# Patient Record
(demographics unavailable — no encounter records)

---

## 2025-04-11 NOTE — REASON FOR VISIT
[Initial Consultation] : an initial consultation
Yes

## 2025-04-15 NOTE — ASSESSMENT
[FreeTextEntry1] : Bernabe Moncada is a 70-year-old female who presents for an initial consultation for newly diagnosed pancreatic adenocarcinoma.   She was presented today at [facility] and was found to have locally advanced, LAPC-2 disease. Of note there is a small lung lesion through to be more inflammatory/infectious in nature, and a bone abnormality with negative bone scan. She is clearly distressed by her new diagnosis and somewhat overwhelmed with the implications. Expressed empathy and noted that the chronic pain she is experiencing could be debilitating to anyone in her position. We advise pain control, starting low dose oxycodone to help manager the pain. Offered her the opportunity to bring her back soon for re-evaluate and chemotherapy discussion at a later date but wishes to aggressively pursue chemotherapy at this point. Expressed some hesitation with chemotherapy eligibility given that she presents today in a wheelchair and given her fatigue but she and her sister endorse this is mainly due to her having not been eating much recently and deconditioning again due to unmet pain needs. I hope that with better pain control and symptom relief she can be a good candidate for gemcitabine/nab-paclitaxel and potentially for trial options such as TigerPac down the line.  Plan: - oxycodone 5 mg PRN for pain control - palliative care for pain control and symptom support; appetite - social work referral for support and transportation - psych onc for emotional support - consented for gemcitabine/nab-paclitaxel but would need to re-evaluate prior to infusion to ensure a good candidate - port - molecular and germline testing sent today  Tulio Villegas MD PhD Medical Oncology Attending I personally have spent a total of 60 minutes of time on the date of this encounter reviewing test results, documenting findings, coordinating care, and directly consulting with the patient and/or designated family member.

## 2025-04-15 NOTE — ASSESSMENT
[FreeTextEntry1] : Bernabe Moncada is a 70-year-old female who presents for an initial consultation for newly diagnosed pancreatic adenocarcinoma.   She was presented today at [facility] and was found to have locally advanced, LAPC-2 disease. Of note there is a small lung lesion through to be more inflammatory/infectious in nature, and a bone abnormality with negative bone scan. She is clearly distressed by her new diagnosis and somewhat overwhelmed with the implications. Expressed empathy and noted that the chronic pain she is experiencing could be debilitating to anyone in her position. We advise pain control, starting low dose oxycodone to help manager the pain. Offered her the opportunity to bring her back soon for re-evaluate and chemotherapy discussion at a later date but wishes to aggressively pursue chemotherapy at this point. Expressed some hesitation with chemotherapy eligibility given that she presents today in a wheelchair and given her fatigue but she and her sister endorse this is mainly due to her having not been eating much recently and deconditioning again due to unmet pain needs. I hope that with better pain control and symptom relief she can be a good candidate for gemcitabine/nab-paclitaxel and potentially for trial options such as TigerPac down the line.  Plan: - oxycodone 5 mg PRN for pain control - palliative care for pain control and symptom support; appetite - social work referral for support and transportation - psych onc for emotional support - consented for gemcitabine/nab-paclitaxel but would need to re-evaluate prior to infusion to ensure a good candidate - port - molecular and germline testing sent today  Tulio Villegas MD PhD Medical Oncology Attending I personally have spent a total of 60 minutes of time on the date of this encounter reviewing test results, documenting findings, coordinating care, and directly consulting with the patient and/or designated family member.

## 2025-04-15 NOTE — ASSESSMENT
[FreeTextEntry1] : Bernabe Moncada is a 70-year-old female who presents for an initial consultation for newly diagnosed pancreatic adenocarcinoma.   She was presented today at [facility] and was found to have locally advanced, LAPC-2 disease. Of note there is a small lung lesion through to be more inflammatory/infectious in nature, and a bone abnormality with negative bone scan. She is clearly distressed by her new diagnosis and somewhat overwhelmed with the implications. Expressed empathy and noted that the chronic pain she is experiencing could be debilitating to anyone in her position. We advise pain control, starting low dose oxycodone to help manager the pain. Offered her the opportunity to bring her back soon for re-evaluate and chemotherapy discussion at a later date but wishes to aggressively pursue chemotherapy at this point. Expressed some hesitation with chemotherapy eligibility given that she presents today in a wheelchair and given her fatigue but she and her sister endorse this is mainly due to her having not been eating much recently and deconditioning again due to unmet pain needs. I hope that with better pain control and symptom relief she can be a good candidate for gemcitabine/nab-paclitaxel and potentially for trial options such as TigerPac down the line.  Plan: - oxycodone 5 mg PRN for pain control - palliative care for pain control and symptom support; appetite - social work referral for support and transportation - psych onc for emotional support - consented for gemcitabine/nab-paclitaxel but would need to re-evaluate prior to infusion to ensure a good candidate - port - molecular and germline testing sent today  Tulio Villegas MD PhD Medical Oncology Attending I personally have spent a total of 60 minutes of time on the date of this encounter reviewing test results, documenting findings, coordinating care, and directly consulting with the patient and/or designated family member.

## 2025-04-15 NOTE — ASSESSMENT
[FreeTextEntry1] : 70F with 1.8 x.1.2 cm pancreas uncinate process mass with invasion of the celiac artery as well as proximal SMA, abutting splenic vein. Initially noted on workup for weakness and confusion. FNB biopsy proven adenocarcinoma.  3/30/25 - Ca 19-9: < 2, CEA: 1.9, Tbili: 0.6, A1c: 7.2%  Case reviewed in multidisciplinary conference and discussed with radiology, advanced GI, medical oncology, radiation oncology. We discussed that best outcomes for patients with pancreatic cancer are achieved with combination of chemotherapy and surgery, but the sequencing is frequently debated. We discussed Whipple procedure with curative intent, but that I favor a neoadjuvant approach. I reviewed the potential benefits of upfront systemic treatment with the patient including the potentially decreasing the size of the tumor, early treatment of metastatic disease, and test of tumor biology.  We discussed our recommendation for induction chemotherapy followed by restaging scans in 2 months.  Plan: Labs today Mediport placement 2 months of upfront chemo w/ med/onc Dr. Villegas with restaging scans in 2 months  Genetics testing Social work and dietician referral.

## 2025-04-15 NOTE — PHYSICAL EXAM
[FreeTextEntry1] : General: No acute distress. Well nourished and well kempt. Head: AT/NC Eyes: PERRL. EOMI. Pulmonary: No respiratory distress. Trach intact. ABD: Soft. NT/ND. No rebound, no guarding, no rigidity. No peritoneal signs. No masses. Extremities: Warm. No edema. Neurological: A&O x 4. Psychiatric: Normal affect and mood.

## 2025-04-15 NOTE — END OF VISIT
[FreeTextEntry3] : By signing my name below, I, Meildaelza Edwards, attest that this documentation has been prepared under the direction and in the presence of Bianca Smyth MD in the following sections HISTORY OF PRESENT ILLNESS; PAST MEDICAL/FAMILY/SOCIAL HISTORY; REVIEW OF SYSTEMS; PHYSICAL EXAM; ASSESSMENT/PLAN.

## 2025-04-15 NOTE — HISTORY OF PRESENT ILLNESS
[de-identified] : Ms. XOCHILT FLORES is a 70-year-old female [facility] patient presents for initial consultation regarding biopsy proven PDAC.  She initially presented to [facility] on 3/29/25 with weakness and confusion, 60 lbs weight loss over 2 months. CT A/P 3/29/2025 demonstrated new small soft tissue mass versus lymphadenopathy in the preaortic retroperitoneum which abuts the celiac axis and the proximal superior mesenteric artery. Indeterminate heterogeneous lesion in the L1 vertebral body.  CT chest 2025 showed a hypoenhancing infiltrative, diffusion restricted soft tissue mass again visualized encasing celiac axis and proximal branches as well as proximal SMA, abutting splenic vein, and involving uncinate process of the pancreas. The mass also appears to extend along the diaphragmatic pleura anterior to the aorta and posterior to the IVC best visualized on the diffusion restricted sequences. Partial opacification portosplenic confluence although well visualized on recent CT. Appearance suspicious for primary pancreatic carcinoma.  MRI 3/30/2025 demonstrated a hypoenhancing infiltrative, diffusion restricted soft tissue mass encasing celiac axis and proximal branches as well as proximal SMA, abutting splenic vein, and involving uncinate process of the pancreas. The mass also appears to extend along the diaphragmatic pleura anterior to the aorta and posterior to the IVC best visualized on the diffusion restricted sequences.  NM bone scan 3/31/25: No radionuclide evidence of osseous metastases.  Underwent EUS 2025 with Dr. Lane Melo which demonstrated a 18 x 12 mm mass in the uncinate with invasion of the celiac artery. FNB pathology revealed adenocarcinoma.  Has poor appetite / not eating well. Has nausea without vomiting. + Abdominal pain, taking tylenol with some relief. No steatorrhea.  Patient presents for initial consultation; also met with med/onc Dr. Villegas today.  Labs: 3/30/25 - Ca 19-9: < 2, CEA: 1.9, Tbili: 0.6, A1c: 7.2%.  PMHx: T2DM, HLD, HTN PSHx: none FMHx: no family cancer hx Social: H/oETOH abuse (drank 1 gallon of alcohol per week for about 10 years but has not drank recently) ECO

## 2025-04-15 NOTE — HISTORY OF PRESENT ILLNESS
[de-identified] : ID: Bernabe Moncada is a 70-year-old female who presents for an initial consultation.   Chronological Hx of Cancer: 03/29/25: Brought to [facility] by her sister for weakness, confusion, generalized pain, abdominal pain worse with eating, and 60lb wt loss over the past 2 months.  CT A/P: New small soft tissue mass versus lymphadenopathy in the preaortic retroperitoneum which abuts the celiac axis and the proximal superior mesenteric artery. Indeterminate heterogeneous lesion in the L1 vertebral body for which a metastatic focus is not excluded.  CT Head/Neck/Brain: Negative. 03/30/25 MR Abd: Locally infiltrative preaortic mass likely primary pancreatic carcinoma. 03/31/25 MR Lumbar Spine: Abnormal signal L1 vertebral body likely may represents a focal hemangioma 04/01/25 CT chest: Nonspecific very small nodule in the lingula, new since 2015. Recommend follow-up in 3 months, or per guidelines of primary neoplasm. 04/02/25 EUS of pancreatic head showed adenocarcinoma 04/06/25 EUS: - Erythematous gastritis, Biopsied - chronic inactive gastritis. 18 x 12 mm pancreatic uncinate mass with invasion of celiac artery, FNB - adenocarcinoma.     MMR status (all GI cancers): Tumor Mutation Data (if advanced): requested 4/15 Germline Data (if pancreas or young): sent 4/15 Signatera/ctDNA testing: HER2 (if eso/gastric): PD-L1 (if eso/gastric):   PMHx:T2DM, HLD, and HTN. H/o ETOH abuse ( drank 1 gallon of alcohol per week for about 10 years but has not drank recently) PSHx: Allergies: Social Hx: lives in [place] by herself, has a cousin Ladan who is a nurse, CNA; came from [facility] 25 years - h/o smoking/ ETOH use: - place of residency: - job: - hobby: - family: Goals of Care considered: 4/15 Pancreatic enyzmes considered (if pancreas): 4/15 Palliative care referral considered: 4/15 [de-identified] : 4/15/25 - minimal walking - stayed in bed all day - had a fall at home a couple of months ago - mostly has pain in the abdomen 10/10, tried tylenol but no help - weight is 144 pounds, lost 10 pounds in last 6 months - the pain impacts her ability to eat well - hasn't walked around the block since December

## 2025-04-15 NOTE — HISTORY OF PRESENT ILLNESS
[de-identified] : ID: Bernabe Moncada is a 70-year-old female who presents for an initial consultation.   Chronological Hx of Cancer: 03/29/25: Brought to [facility] by her sister for weakness, confusion, generalized pain, abdominal pain worse with eating, and 60lb wt loss over the past 2 months.  CT A/P: New small soft tissue mass versus lymphadenopathy in the preaortic retroperitoneum which abuts the celiac axis and the proximal superior mesenteric artery. Indeterminate heterogeneous lesion in the L1 vertebral body for which a metastatic focus is not excluded.  CT Head/Neck/Brain: Negative. 03/30/25 MR Abd: Locally infiltrative preaortic mass likely primary pancreatic carcinoma. 03/31/25 MR Lumbar Spine: Abnormal signal L1 vertebral body likely may represents a focal hemangioma 04/01/25 CT chest: Nonspecific very small nodule in the lingula, new since 2015. Recommend follow-up in 3 months, or per guidelines of primary neoplasm. 04/02/25 EUS of pancreatic head showed adenocarcinoma 04/06/25 EUS: - Erythematous gastritis, Biopsied - chronic inactive gastritis. 18 x 12 mm pancreatic uncinate mass with invasion of celiac artery, FNB - adenocarcinoma.     MMR status (all GI cancers): Tumor Mutation Data (if advanced): requested 4/15 Germline Data (if pancreas or young): sent 4/15 Signatera/ctDNA testing: HER2 (if eso/gastric): PD-L1 (if eso/gastric):   PMHx:T2DM, HLD, and HTN. H/o ETOH abuse ( drank 1 gallon of alcohol per week for about 10 years but has not drank recently) PSHx: Allergies: Social Hx: lives in [place] by herself, has a cousin Ladan who is a nurse, CNA; came from [facility] 25 years - h/o smoking/ ETOH use: - place of residency: - job: - hobby: - family: Goals of Care considered: 4/15 Pancreatic enyzmes considered (if pancreas): 4/15 Palliative care referral considered: 4/15 [de-identified] : 4/15/25 - minimal walking - stayed in bed all day - had a fall at home a couple of months ago - mostly has pain in the abdomen 10/10, tried tylenol but no help - weight is 144 pounds, lost 10 pounds in last 6 months - the pain impacts her ability to eat well - hasn't walked around the block since December

## 2025-04-15 NOTE — HISTORY OF PRESENT ILLNESS
[de-identified] : ID: Bernabe Moncada is a 70-year-old female who presents for an initial consultation.   Chronological Hx of Cancer: 03/29/25: Brought to [facility] by her sister for weakness, confusion, generalized pain, abdominal pain worse with eating, and 60lb wt loss over the past 2 months.  CT A/P: New small soft tissue mass versus lymphadenopathy in the preaortic retroperitoneum which abuts the celiac axis and the proximal superior mesenteric artery. Indeterminate heterogeneous lesion in the L1 vertebral body for which a metastatic focus is not excluded.  CT Head/Neck/Brain: Negative. 03/30/25 MR Abd: Locally infiltrative preaortic mass likely primary pancreatic carcinoma. 03/31/25 MR Lumbar Spine: Abnormal signal L1 vertebral body likely may represents a focal hemangioma 04/01/25 CT chest: Nonspecific very small nodule in the lingula, new since 2015. Recommend follow-up in 3 months, or per guidelines of primary neoplasm. 04/02/25 EUS of pancreatic head showed adenocarcinoma 04/06/25 EUS: - Erythematous gastritis, Biopsied - chronic inactive gastritis. 18 x 12 mm pancreatic uncinate mass with invasion of celiac artery, FNB - adenocarcinoma.     MMR status (all GI cancers): Tumor Mutation Data (if advanced): requested 4/15 Germline Data (if pancreas or young): sent 4/15 Signatera/ctDNA testing: HER2 (if eso/gastric): PD-L1 (if eso/gastric):   PMHx:T2DM, HLD, and HTN. H/o ETOH abuse ( drank 1 gallon of alcohol per week for about 10 years but has not drank recently) PSHx: Allergies: Social Hx: lives in [place] by herself, has a cousin Ladan who is a nurse, CNA; came from [facility] 25 years - h/o smoking/ ETOH use: - place of residency: - job: - hobby: - family: Goals of Care considered: 4/15 Pancreatic enyzmes considered (if pancreas): 4/15 Palliative care referral considered: 4/15 [de-identified] : 4/15/25 - minimal walking - stayed in bed all day - had a fall at home a couple of months ago - mostly has pain in the abdomen 10/10, tried tylenol but no help - weight is 144 pounds, lost 10 pounds in last 6 months - the pain impacts her ability to eat well - hasn't walked around the block since December

## 2025-04-16 NOTE — HISTORY OF PRESENT ILLNESS
[Abdominal Pain] : abdominal pain [Weight loss] : weight loss [EUS] : EUS [Biopsy] : biopsy performed [Not seen outside] : not seen outside [Nutrition] : Nutrition [Social Work] : Social Work [Palliative Care] : Palliative Care [Ambulatory and capable of all self care but unable to carry out any work activities] : Status 2 - Ambulatory and capable of all self care but unable to carry out any work activities. Up and about more than 50% of waking hours [de-identified] : This is a non-billable note*   Ms. IRVING FLORES is a 70 year old female who presents for evaluation in our Pancreas Multidisciplinary Clinic. Her PMH includes T2DM, HLD,HTN. No PSH.    Recently presented to [facility] on 3/29/25 with weakness and confusion, 60 lbs weight loss over 2 months.    CT A/P 3/29/25 showing new small soft tissue mass versus lymphadenopathy in the preaortic retroperitoneum which abuts the celiac axis and the proximal superior mesenteric artery.  Indeterminate heterogeneous lesion in the L1 vertebral body.   CT chest 25:  Hypoenhancing infiltrative, diffusion restricted soft tissue mass again visualized encasing celiac axis and proximal branches as well as proximal SMA, abutting splenic vein, and involving uncinate process of the pancreas. The mass also appears to extend along the diaphragmatic pleura anterior to the aorta and posterior to the IVC best visualized on the diffusion restricted sequences. Partial opacification portosplenic confluence although well visualized on recent CT.  Appearance suspicious for primary pancreatic carcinoma.    MRI 3/30/25: Hypoenhancing infiltrative, diffusion restricted soft tissue mass encasing celiac axis and proximal branches as well as proximal SMA, abutting splenic vein, and involving uncinate process of the pancreas. The mass also appears to extend along the diaphragmatic pleura anterior to the aorta and posterior to the IVC best visualized on the diffusion restricted sequences.    NM bone scan 3/31/25: No radionuclide evidence of osseous metastases.   EUS 25:  18 x 12 mm mass in the uncinate with invasion of the celiac artery.  s/p FNB.  Pathology: adenocarcinoma   Has poor appetite / not eating well. Has nausea without vomiting.   + Abdominal pain, taking tylenol with some relief.  No steatorrhea.   ECO Family ca hx: none   Labs:  3/30/25  Ca 19-9: < 2  CEA: 1.9   Tbili:  0.6  A1c: 7.2%   [TextBox_4] : 03/29/2025 [TextBox_39] : [id number] [TextBox_41] : Adenocarcinoma  [TextBox_43] : 04/02/2025 [Pancreatic ductal adenocarcinoma] : Pancreatic ductal adenocarcinoma [Locally advanced pancreas cancer (LAPC 2)] : Locally advanced pancreas cancer (LAPC 2) [Celiac axis] : Celiac axis [SMA] : SMA [More than 180 (encasement)] : more than 180 (encasement) [uncinate] : uncinate [Chemotherapy] : chemotherapy [TextBox_5] : 04/14/2025 [TextBox_38] : < 2 [FreeTextEntry3] : 1.9 [FreeTextEntry4] : 0.6 [TextBox_40] : 03/29/2025 [TextBox_56] : 23 [TextBox_74] : RP mass contiguous with the uncinate process. Encasement of the celiac axis/ SMA. Narrowing of the portal confluence.    CT chest:  5mm nodular opacity (inflammation or impacted airway)   [FreeTextEntry6] : Neoadjuvant chemotherapy, re-review in 2 months  Supportive care

## 2025-04-16 NOTE — HISTORY OF PRESENT ILLNESS
[Abdominal Pain] : abdominal pain [Weight loss] : weight loss [EUS] : EUS [Biopsy] : biopsy performed [Not seen outside] : not seen outside [Nutrition] : Nutrition [Social Work] : Social Work [Palliative Care] : Palliative Care [Ambulatory and capable of all self care but unable to carry out any work activities] : Status 2 - Ambulatory and capable of all self care but unable to carry out any work activities. Up and about more than 50% of waking hours [de-identified] : This is a non-billable note*   Ms. IRVING FLORES is a 70 year old female who presents for evaluation in our Pancreas Multidisciplinary Clinic. Her PMH includes T2DM, HLD,HTN. No PSH.    Recently presented to [facility] on 3/29/25 with weakness and confusion, 60 lbs weight loss over 2 months.    CT A/P 3/29/25 showing new small soft tissue mass versus lymphadenopathy in the preaortic retroperitoneum which abuts the celiac axis and the proximal superior mesenteric artery.  Indeterminate heterogeneous lesion in the L1 vertebral body.   CT chest 25:  Hypoenhancing infiltrative, diffusion restricted soft tissue mass again visualized encasing celiac axis and proximal branches as well as proximal SMA, abutting splenic vein, and involving uncinate process of the pancreas. The mass also appears to extend along the diaphragmatic pleura anterior to the aorta and posterior to the IVC best visualized on the diffusion restricted sequences. Partial opacification portosplenic confluence although well visualized on recent CT.  Appearance suspicious for primary pancreatic carcinoma.    MRI 3/30/25: Hypoenhancing infiltrative, diffusion restricted soft tissue mass encasing celiac axis and proximal branches as well as proximal SMA, abutting splenic vein, and involving uncinate process of the pancreas. The mass also appears to extend along the diaphragmatic pleura anterior to the aorta and posterior to the IVC best visualized on the diffusion restricted sequences.    NM bone scan 3/31/25: No radionuclide evidence of osseous metastases.   EUS 25:  18 x 12 mm mass in the uncinate with invasion of the celiac artery.  s/p FNB.  Pathology: adenocarcinoma   Has poor appetite / not eating well. Has nausea without vomiting.   + Abdominal pain, taking tylenol with some relief.  No steatorrhea.   ECO Family ca hx: none   Labs:  3/30/25  Ca 19-9: < 2  CEA: 1.9   Tbili:  0.6  A1c: 7.2%   [TextBox_4] : 03/29/2025 [TextBox_39] : [id number] [TextBox_41] : Adenocarcinoma  [TextBox_43] : 04/02/2025 [Pancreatic ductal adenocarcinoma] : Pancreatic ductal adenocarcinoma [Locally advanced pancreas cancer (LAPC 2)] : Locally advanced pancreas cancer (LAPC 2) [Celiac axis] : Celiac axis [SMA] : SMA [More than 180 (encasement)] : more than 180 (encasement) [uncinate] : uncinate [Chemotherapy] : chemotherapy [TextBox_5] : 04/14/2025 [TextBox_38] : < 2 [FreeTextEntry3] : 1.9 [FreeTextEntry4] : 0.6 [TextBox_40] : 03/29/2025 [TextBox_56] : 23 [TextBox_74] : RP mass contiguous with the uncinate process. Encasement of the celiac axis/ SMA. Narrowing of the portal confluence.    CT chest:  5mm nodular opacity (inflammation or impacted airway)   [FreeTextEntry6] : Neoadjuvant chemotherapy, re-review in 2 months  Supportive care

## 2025-05-09 NOTE — ASSESSMENT
[FreeTextEntry1] : 71 yo F with:   #Pancreatic Cancer  #  #  #  Encounter for palliative care- emotional support provided.   Follow up in one month, call sooner with questions or issues.

## 2025-05-09 NOTE — HISTORY OF PRESENT ILLNESS
[FreeTextEntry1] : 69 yo F with pancreatic cancer presents for initial palliative care visit, referred by oncology.   [facility] significant for   Ms. XOCHILT FLORES is a 70-year-old female [facility] patient presents for initial consultation regarding biopsy proven PDAC.  She initially presented to [facility] on 3/29/25 with weakness and confusion, 60 lbs weight loss over 2 months. CT A/P 3/29/2025 demonstrated new small soft tissue mass versus lymphadenopathy in the preaortic retroperitoneum which abuts the celiac axis and the proximal superior mesenteric artery. Indeterminate heterogeneous lesion in the L1 vertebral body.  CT chest 2025 showed a hypoenhancing infiltrative, diffusion restricted soft tissue mass again visualized encasing celiac axis and proximal branches as well as proximal SMA, abutting splenic vein, and involving uncinate process of the pancreas. The mass also appears to extend along the diaphragmatic pleura anterior to the aorta and posterior to the IVC best visualized on the diffusion restricted sequences. Partial opacification portosplenic confluence although well visualized on recent CT. Appearance suspicious for primary pancreatic carcinoma.  MRI 3/30/2025 demonstrated a hypoenhancing infiltrative, diffusion restricted soft tissue mass encasing celiac axis and proximal branches as well as proximal SMA, abutting splenic vein, and involving uncinate process of the pancreas. The mass also appears to extend along the diaphragmatic pleura anterior to the aorta and posterior to the IVC best visualized on the diffusion restricted sequences.  NM bone scan 3/31/25: No radionuclide evidence of osseous metastases.  Underwent EUS 2025 with Dr. Lane Melo which demonstrated a 18 x 12 mm mass in the uncinate with invasion of the celiac artery. FNB pathology revealed adenocarcinoma.  Has poor appetite / not eating well. Has nausea without vomiting. + Abdominal pain, taking tylenol with some relief. No steatorrhea.  PMHx: T2DM, HLD, HTN PSHx: none FMHx: no family cancer hx Social: H/oETOH abuse (drank 1 gallon of alcohol per week for about 10 years but has not drank recently) ECO. -------------------------------------------------------------------------------------------------------- Pt was referred to supportive oncology for symptom management.   Pain: Constipation/Diarrhea: Nausea/Vomiting: Appetite/Weight changes: Sleep: Mood:   Med update:    Other concerns:   Social:   Advance Directives / Decision Makers:     I-STOP Ref#:[id number] PDI	Current Rx	Drug Type	Rx Written	Rx Dispensed	Drug	Quantity	Days Supply	Prescriber Name	Prescriber MARK #	Payment Method	Dispenser A	Y	O	2025	oxycodone hcl (ir) 5 mg tablet	60	10	Goldberg, Zoe MD	[id number]	Medicare	[facility] [id number] A	N	O	04/15/2025	2025	oxycodone hcl (ir) 5 mg tablet	42	7	Dorothea Bernabe	[id number]	Medicare	[facility] [id number]

## 2025-05-09 NOTE — HISTORY OF PRESENT ILLNESS
[FreeTextEntry1] : 69 yo F with pancreatic cancer presents for initial palliative care visit, referred by oncology.   [facility] significant for   Ms. XOCHILT FLORES is a 70-year-old female [facility] patient presents for initial consultation regarding biopsy proven PDAC.  She initially presented to [facility] on 3/29/25 with weakness and confusion, 60 lbs weight loss over 2 months. CT A/P 3/29/2025 demonstrated new small soft tissue mass versus lymphadenopathy in the preaortic retroperitoneum which abuts the celiac axis and the proximal superior mesenteric artery. Indeterminate heterogeneous lesion in the L1 vertebral body.  CT chest 2025 showed a hypoenhancing infiltrative, diffusion restricted soft tissue mass again visualized encasing celiac axis and proximal branches as well as proximal SMA, abutting splenic vein, and involving uncinate process of the pancreas. The mass also appears to extend along the diaphragmatic pleura anterior to the aorta and posterior to the IVC best visualized on the diffusion restricted sequences. Partial opacification portosplenic confluence although well visualized on recent CT. Appearance suspicious for primary pancreatic carcinoma.  MRI 3/30/2025 demonstrated a hypoenhancing infiltrative, diffusion restricted soft tissue mass encasing celiac axis and proximal branches as well as proximal SMA, abutting splenic vein, and involving uncinate process of the pancreas. The mass also appears to extend along the diaphragmatic pleura anterior to the aorta and posterior to the IVC best visualized on the diffusion restricted sequences.  NM bone scan 3/31/25: No radionuclide evidence of osseous metastases.  Underwent EUS 2025 with Dr. Lane Melo which demonstrated a 18 x 12 mm mass in the uncinate with invasion of the celiac artery. FNB pathology revealed adenocarcinoma.  Has poor appetite / not eating well. Has nausea without vomiting. + Abdominal pain, taking tylenol with some relief. No steatorrhea.  PMHx: T2DM, HLD, HTN PSHx: none FMHx: no family cancer hx Social: H/oETOH abuse (drank 1 gallon of alcohol per week for about 10 years but has not drank recently) ECO. -------------------------------------------------------------------------------------------------------- Pt was referred to supportive oncology for symptom management.   Pain: Constipation/Diarrhea: Nausea/Vomiting: Appetite/Weight changes: Sleep: Mood:   Med update:    Other concerns:   Social:   Advance Directives / Decision Makers:     I-STOP Ref#:[id number] PDI	Current Rx	Drug Type	Rx Written	Rx Dispensed	Drug	Quantity	Days Supply	Prescriber Name	Prescriber MARK #	Payment Method	Dispenser A	Y	O	2025	oxycodone hcl (ir) 5 mg tablet	60	10	Goldberg, Zoe MD	[id number]	Medicare	[facility] [id number] A	N	O	04/15/2025	2025	oxycodone hcl (ir) 5 mg tablet	42	7	Dorothea Bernabe	[id number]	Medicare	[facility] [id number]

## 2025-05-09 NOTE — ASSESSMENT
[FreeTextEntry1] : 69 yo F with:   #Pancreatic Cancer  #  #  #  Encounter for palliative care- emotional support provided.   Follow up in one month, call sooner with questions or issues.

## 2025-05-09 NOTE — HISTORY OF PRESENT ILLNESS
[FreeTextEntry1] : 69 yo F with pancreatic cancer presents for initial palliative care visit, referred by oncology.   [facility] significant for   Ms. XOCHILT FLORES is a 70-year-old female [facility] patient presents for initial consultation regarding biopsy proven PDAC.  She initially presented to [facility] on 3/29/25 with weakness and confusion, 60 lbs weight loss over 2 months. CT A/P 3/29/2025 demonstrated new small soft tissue mass versus lymphadenopathy in the preaortic retroperitoneum which abuts the celiac axis and the proximal superior mesenteric artery. Indeterminate heterogeneous lesion in the L1 vertebral body.  CT chest 2025 showed a hypoenhancing infiltrative, diffusion restricted soft tissue mass again visualized encasing celiac axis and proximal branches as well as proximal SMA, abutting splenic vein, and involving uncinate process of the pancreas. The mass also appears to extend along the diaphragmatic pleura anterior to the aorta and posterior to the IVC best visualized on the diffusion restricted sequences. Partial opacification portosplenic confluence although well visualized on recent CT. Appearance suspicious for primary pancreatic carcinoma.  MRI 3/30/2025 demonstrated a hypoenhancing infiltrative, diffusion restricted soft tissue mass encasing celiac axis and proximal branches as well as proximal SMA, abutting splenic vein, and involving uncinate process of the pancreas. The mass also appears to extend along the diaphragmatic pleura anterior to the aorta and posterior to the IVC best visualized on the diffusion restricted sequences.  NM bone scan 3/31/25: No radionuclide evidence of osseous metastases.  Underwent EUS 2025 with Dr. Lane Melo which demonstrated a 18 x 12 mm mass in the uncinate with invasion of the celiac artery. FNB pathology revealed adenocarcinoma.  Has poor appetite / not eating well. Has nausea without vomiting. + Abdominal pain, taking tylenol with some relief. No steatorrhea.  PMHx: T2DM, HLD, HTN PSHx: none FMHx: no family cancer hx Social: H/oETOH abuse (drank 1 gallon of alcohol per week for about 10 years but has not drank recently) ECO. -------------------------------------------------------------------------------------------------------- Pt was referred to supportive oncology for symptom management.   Pain: Constipation/Diarrhea: Nausea/Vomiting: Appetite/Weight changes: Sleep: Mood:   Med update:    Other concerns:   Social:   Advance Directives / Decision Makers:     I-STOP Ref#:[id number] PDI	Current Rx	Drug Type	Rx Written	Rx Dispensed	Drug	Quantity	Days Supply	Prescriber Name	Prescriber MARK #	Payment Method	Dispenser A	Y	O	2025	oxycodone hcl (ir) 5 mg tablet	60	10	Goldberg, Zoe MD	[id number]	Medicare	[facility] [id number] A	N	O	04/15/2025	2025	oxycodone hcl (ir) 5 mg tablet	42	7	Dorothea Bernabe	[id number]	Medicare	[facility] [id number]